# Patient Record
Sex: FEMALE | URBAN - METROPOLITAN AREA
[De-identification: names, ages, dates, MRNs, and addresses within clinical notes are randomized per-mention and may not be internally consistent; named-entity substitution may affect disease eponyms.]

---

## 2022-01-07 ENCOUNTER — APPOINTMENT (RX ONLY)
Dept: URBAN - METROPOLITAN AREA CLINIC 23 | Facility: CLINIC | Age: 42
Setting detail: DERMATOLOGY
End: 2022-01-07

## 2022-01-07 DIAGNOSIS — Z41.9 ENCOUNTER FOR PROCEDURE FOR PURPOSES OTHER THAN REMEDYING HEALTH STATE, UNSPECIFIED: ICD-10-CM

## 2022-01-07 PROCEDURE — ? RECOMMENDATIONS

## 2022-01-07 NOTE — PROCEDURE: RECOMMENDATIONS
Render Risk Assessment In Note?: no
Recommendations (Free Text): Quote patient $700â¦Picoway for brown spots and V-Beam for red blood vessels Quoted $350\\nDiscussed Botox $400 per area
Detail Level: Detailed

## 2022-01-13 ENCOUNTER — APPOINTMENT (RX ONLY)
Dept: URBAN - METROPOLITAN AREA CLINIC 23 | Facility: CLINIC | Age: 42
Setting detail: DERMATOLOGY
End: 2022-01-13

## 2022-01-13 DIAGNOSIS — Z41.9 ENCOUNTER FOR PROCEDURE FOR PURPOSES OTHER THAN REMEDYING HEALTH STATE, UNSPECIFIED: ICD-10-CM

## 2022-01-13 PROCEDURE — ? PICOWAY LASER

## 2022-01-13 PROCEDURE — ? PULSED-DYE LASER

## 2022-01-13 PROCEDURE — ? BOTOX

## 2022-01-13 NOTE — PROCEDURE: BOTOX
Lateral Platysmal Bands Units: 0
Show Glabellar Units: Yes
Forehead Units: 8
Additional Area 5 Location: bunnies lines
Show Mentalis Units: No
Post-Care Instructions: Patient instructed to not lie down for 4 hours and limit physical activity for 24 hours. Patient instructed not to travel by airplane for 48 hours.
Dilution (U/0.1 Cc): 2.5
Additional Area 1 Location: chin
Glabellar Complex Units: 12
Detail Level: Detailed
Additional Area 4 Location: lateral eyes
Periorbital Skin Units: 6
Additional Area 3 Location: lateral brows
Expiration Date (Month Year): 04/24
Consent: Written consent obtained. Risks include but not limited to lid/brow ptosis, bruising, swelling, diplopia, temporary effect, incomplete chemical denervation.
Lot #: [id number]
Additional Area 2 Location: glabella
Additional Area 6 Location: neck bands

## 2022-01-13 NOTE — PROCEDURE: PICOWAY LASER
Spot Size: 6.0 mm
Total Pulses: 5 HZ
Fluence (J/Cm2): 0.6
Post-Care Instructions: I reviewed with the patient in detail post-care instructions. Patient should avoid sun for a minimum of 4 weeks before and after treatment.
Location Override: cheeks
Location Override: brown spots
Pre-Procedure: Prior to proceeding the treatment areas were cleaned and all present put on their eye protection.
Handpiece: Zoom
Spot Size: 6.5 mm
Wavelength: 532 nm
Fluence (J/Cm2): 0.4
Location Override: cheek
Hide Pulse Duration?: No
Handpiece: Resolve
Frequency (Hz): 6 hz
Treatment Number: 1
Fluence (J/Cm2): 1.7
Detail Level: Zone
Post-Procedure Care: Immediate endpoint: perifollicular erythema and edema. Vaseline and ice applied. Post care reviewed with patient.
Wavelength: 1064 nm
Treatment Number: 0
Spot Size: 4.0 mm
Fluence (J/Cm2): 0.5
Frequency (Hz): 2 HZ
Frequency (Hz): 4 HZ
Consent: Written consent obtained, risks reviewed including but not limited to crusting, scabbing, blistering, scarring, darker or lighter pigmentary change, paradoxical hair regrowth, incomplete removal of hair and infection.
Topical Anesthesia Type: 20% benzocaine, 8% lidocaine, 4% tetracaine

## 2022-01-13 NOTE — PROCEDURE: PULSED-DYE LASER
Cryogen Time (Ms): [address] [address]
Treated Area: small area
Consent: Written consent obtained, risks reviewed including but not limited to crusting, scabbing, blistering, scarring, darker or lighter pigmentary change, incidental hair removal, bruising, and/or incomplete removal.
Spot Size: 10 mm
Cryogen Time (Ms): 30
Fluence In J/Cm2 (Optional): 6.50
Immediate Endpoint: erythema
Spot Size: 3 mm
Pulse Duration: 10 ms
Laser Type: Vbeam 595nm
Pulse Duration: 6 ms
Delay Time (Ms): [address] [facility]
Post-Care Instructions: I reviewed with the patient in detail post-care instructions. Patient should stay away from the sun and wear sun protection until treated areas are fully healed.
Delay Time (Ms): 20
Location Override: vessels
Fluence In J/Cm2 (Optional): 11.00
Detail Level: Zone
Delay Time (Ms): 10
Spot Size Override: 3x10
Delay Time (Ms): [address]

## 2023-04-28 ENCOUNTER — APPOINTMENT (RX ONLY)
Dept: URBAN - METROPOLITAN AREA CLINIC 23 | Facility: CLINIC | Age: 43
Setting detail: DERMATOLOGY
End: 2023-04-28

## 2023-04-28 ENCOUNTER — RX ONLY (OUTPATIENT)
Age: 43
Setting detail: RX ONLY
End: 2023-04-28

## 2023-04-28 DIAGNOSIS — Z41.9 ENCOUNTER FOR PROCEDURE FOR PURPOSES OTHER THAN REMEDYING HEALTH STATE, UNSPECIFIED: ICD-10-CM

## 2023-04-28 PROCEDURE — ? PULSED-DYE LASER

## 2023-04-28 PROCEDURE — ? INVENTORY

## 2023-04-28 PROCEDURE — ? RECOMMENDATIONS

## 2023-04-28 PROCEDURE — ? MICRONEEDLING

## 2023-04-28 RX ORDER — AZELAIC ACID/NIACINAMIDE 15 %-4 %
CREAM (GRAM) TOPICAL
Qty: 30 | Refills: 2 | Status: ERX | COMMUNITY
Start: 2023-04-28

## 2023-04-28 NOTE — PROCEDURE: MICRONEEDLING
Depth In Mm: 2
Post-Care Instructions: After the procedure, take precautions agains sun exposure. Do not apply sunscreen for 12 hours after the procedure. Do not apply make-up for 12 hours after the procedure. Avoid alcohol based toners for 10-14 days. After 2-3 days patients can return to their regular skin regimen. \\nFace tip Lot # [id number]
Depth In Mm: 0.1
Infusions (Optional): hyaluronic acid
Depth In Mm: 0.8
Consent: Written consent obtained, risks reviewed including but not limited to pain, scarring, infection and incomplete improvement. Patient understands the procedure is cosmetic in nature and will require out of pocket payment.
Detail Level: Zone
Location #1: Full face
Price (Use Numbers Only, No Special Characters Or $): 350.00
Treatment Number (Optional): 1

## 2023-04-28 NOTE — PROCEDURE: PULSED-DYE LASER
Delay Time (Ms): [address] [facility]
Delay Time (Ms): 20
Spot Size: 7 mm
Detail Level: Detailed
Location Override: Full face spots
Treated Area: small area
Fluence In J/Cm2 (Optional): 3.00
Immediate Endpoint: purpura
Immediate Endpoint: erythema
Consent: Written consent obtained, risks reviewed including but not limited to crusting, scabbing, blistering, scarring, darker or lighter pigmentary change, incidental hair removal, bruising, and/or incomplete removal.
Spot Size: 10 mm
Pulse Duration: 6 ms
Fluence In J/Cm2 (Optional): 6.50 J/cm2
Pulse Duration: 10 ms
Laser Type: Vbeam 595nm
Pulse Duration: .45 ms
Price (Use Numbers Only, No Special Characters Or $): [address] [facility]
Post-Care Instructions: I reviewed with the patient in detail post-care instructions. Patient should stay away from the sun and wear sun protection until treated areas are fully healed.
Cryogen Time (Ms): 10
Cryogen Time (Ms): [address] [address]
Cryogen Time (Ms): 30

## 2023-04-28 NOTE — PROCEDURE: RECOMMENDATIONS
Recommendations (Free Text): IPL $350.00\\nSculptra $900.00
Detail Level: Detailed
Render Risk Assessment In Note?: no

## 2023-05-24 ENCOUNTER — APPOINTMENT (RX ONLY)
Dept: URBAN - METROPOLITAN AREA CLINIC 23 | Facility: CLINIC | Age: 43
Setting detail: DERMATOLOGY
End: 2023-05-24

## 2023-05-24 DIAGNOSIS — Z41.9 ENCOUNTER FOR PROCEDURE FOR PURPOSES OTHER THAN REMEDYING HEALTH STATE, UNSPECIFIED: ICD-10-CM

## 2023-05-24 PROCEDURE — ? PULSED-DYE LASER

## 2023-05-24 NOTE — PROCEDURE: PULSED-DYE LASER
Pulse Duration: 10 ms
Consent: Written consent obtained, risks reviewed including but not limited to crusting, scabbing, blistering, scarring, darker or lighter pigmentary change, incidental hair removal, bruising, and/or incomplete removal.
Cryogen Time (Ms): [address] [address]
Location (Required For Details To Render In Note But Body Touch Will Also Count For First Location): cheeks
Cryogen Time (Ms): 10
Post-Care Instructions: I reviewed with the patient in detail post-care instructions. Patient should stay away from the sun and wear sun protection until treated areas are fully healed.
Detail Level: Detailed
Cryogen Time (Ms): 30
Treated Area: small area
Delay Time (Ms): [address] [facility]
Spot Size: 7 mm
Spot Size: 10x3 mm
Fluence In J/Cm2 (Optional): 10.00
Comments: N/C
Delay Time (Ms): 21
Spot Size: 10 mm
Immediate Endpoint: purpura
Pulse Duration: 40 ms
Immediate Endpoint: erythema
Fluence In J/Cm2 (Optional): 6.50
Price (Use Numbers Only, No Special Characters Or $): [address]
Pulse Duration: 6 ms
Laser Type: Vbeam 595nm